# Patient Record
Sex: MALE | Race: WHITE | Employment: FULL TIME | URBAN - METROPOLITAN AREA
[De-identification: names, ages, dates, MRNs, and addresses within clinical notes are randomized per-mention and may not be internally consistent; named-entity substitution may affect disease eponyms.]

---

## 2021-08-02 ENCOUNTER — HOSPITAL ENCOUNTER (EMERGENCY)
Age: 31
Discharge: HOME | End: 2021-08-02
Attending: EMERGENCY MEDICINE
Payer: COMMERCIAL

## 2021-08-02 ENCOUNTER — APPOINTMENT (OUTPATIENT)
Dept: GENERAL RADIOLOGY | Age: 31
End: 2021-08-02
Attending: EMERGENCY MEDICINE
Payer: COMMERCIAL

## 2021-08-02 VITALS
HEIGHT: 67 IN | DIASTOLIC BLOOD PRESSURE: 96 MMHG | RESPIRATION RATE: 27 BRPM | OXYGEN SATURATION: 94 % | SYSTOLIC BLOOD PRESSURE: 142 MMHG | WEIGHT: 188.71 LBS | HEART RATE: 106 BPM | BODY MASS INDEX: 29.62 KG/M2 | TEMPERATURE: 99.3 F

## 2021-08-02 DIAGNOSIS — J12.9 VIRAL PNEUMONIA: Primary | ICD-10-CM

## 2021-08-02 LAB
ALBUMIN SERPL-MCNC: 3.5 G/DL (ref 3.5–5)
ALBUMIN/GLOB SERPL: 0.9 {RATIO} (ref 1.1–2.2)
ALP SERPL-CCNC: 61 U/L (ref 45–117)
ALT SERPL-CCNC: 30 U/L (ref 12–78)
ANION GAP SERPL CALC-SCNC: 13 MMOL/L (ref 5–15)
AST SERPL-CCNC: 38 U/L (ref 15–37)
BASOPHILS # BLD: 0 K/UL (ref 0–0.1)
BASOPHILS NFR BLD: 0 % (ref 0–1)
BILIRUB SERPL-MCNC: 0.6 MG/DL (ref 0.2–1)
BUN SERPL-MCNC: 18 MG/DL (ref 6–20)
BUN/CREAT SERPL: 15 (ref 12–20)
CALCIUM SERPL-MCNC: 8.5 MG/DL (ref 8.5–10.1)
CHLORIDE SERPL-SCNC: 98 MMOL/L (ref 97–108)
CO2 SERPL-SCNC: 25 MMOL/L (ref 21–32)
CREAT SERPL-MCNC: 1.24 MG/DL (ref 0.7–1.3)
DIFFERENTIAL METHOD BLD: ABNORMAL
EOSINOPHIL # BLD: 0 K/UL (ref 0–0.4)
EOSINOPHIL NFR BLD: 0 % (ref 0–7)
ERYTHROCYTE [DISTWIDTH] IN BLOOD BY AUTOMATED COUNT: 12.4 % (ref 11.5–14.5)
GLOBULIN SER CALC-MCNC: 3.8 G/DL (ref 2–4)
GLUCOSE SERPL-MCNC: 93 MG/DL (ref 65–100)
HCT VFR BLD AUTO: 42.9 % (ref 36.6–50.3)
HGB BLD-MCNC: 14.8 G/DL (ref 12.1–17)
IMM GRANULOCYTES # BLD AUTO: 0 K/UL (ref 0–0.04)
IMM GRANULOCYTES NFR BLD AUTO: 0 % (ref 0–0.5)
LYMPHOCYTES # BLD: 1.1 K/UL (ref 0.8–3.5)
LYMPHOCYTES NFR BLD: 22 % (ref 12–49)
MCH RBC QN AUTO: 29.4 PG (ref 26–34)
MCHC RBC AUTO-ENTMCNC: 34.5 G/DL (ref 30–36.5)
MCV RBC AUTO: 85.3 FL (ref 80–99)
MONOCYTES # BLD: 0.3 K/UL (ref 0–1)
MONOCYTES NFR BLD: 5 % (ref 5–13)
NEUTS SEG # BLD: 3.6 K/UL (ref 1.8–8)
NEUTS SEG NFR BLD: 73 % (ref 32–75)
NRBC # BLD: 0 K/UL (ref 0–0.01)
NRBC BLD-RTO: 0 PER 100 WBC
PLATELET # BLD AUTO: 113 K/UL (ref 150–400)
PMV BLD AUTO: 12.1 FL (ref 8.9–12.9)
POTASSIUM SERPL-SCNC: 3.5 MMOL/L (ref 3.5–5.1)
PROT SERPL-MCNC: 7.3 G/DL (ref 6.4–8.2)
RBC # BLD AUTO: 5.03 M/UL (ref 4.1–5.7)
SODIUM SERPL-SCNC: 136 MMOL/L (ref 136–145)
TROPONIN I SERPL-MCNC: <0.05 NG/ML
WBC # BLD AUTO: 5 K/UL (ref 4.1–11.1)

## 2021-08-02 PROCEDURE — 85025 COMPLETE CBC W/AUTO DIFF WBC: CPT

## 2021-08-02 PROCEDURE — 84484 ASSAY OF TROPONIN QUANT: CPT

## 2021-08-02 PROCEDURE — 74011250636 HC RX REV CODE- 250/636: Performed by: EMERGENCY MEDICINE

## 2021-08-02 PROCEDURE — 94640 AIRWAY INHALATION TREATMENT: CPT

## 2021-08-02 PROCEDURE — 96361 HYDRATE IV INFUSION ADD-ON: CPT

## 2021-08-02 PROCEDURE — 36415 COLL VENOUS BLD VENIPUNCTURE: CPT

## 2021-08-02 PROCEDURE — 93005 ELECTROCARDIOGRAM TRACING: CPT

## 2021-08-02 PROCEDURE — 74011250637 HC RX REV CODE- 250/637: Performed by: EMERGENCY MEDICINE

## 2021-08-02 PROCEDURE — 96374 THER/PROPH/DIAG INJ IV PUSH: CPT

## 2021-08-02 PROCEDURE — 71045 X-RAY EXAM CHEST 1 VIEW: CPT

## 2021-08-02 PROCEDURE — 99285 EMERGENCY DEPT VISIT HI MDM: CPT

## 2021-08-02 PROCEDURE — 80053 COMPREHEN METABOLIC PANEL: CPT

## 2021-08-02 RX ORDER — ONDANSETRON 2 MG/ML
4 INJECTION INTRAMUSCULAR; INTRAVENOUS
Status: COMPLETED | OUTPATIENT
Start: 2021-08-02 | End: 2021-08-02

## 2021-08-02 RX ORDER — ONDANSETRON 4 MG/1
4 TABLET, ORALLY DISINTEGRATING ORAL
Qty: 20 TABLET | Refills: 0 | Status: SHIPPED | OUTPATIENT
Start: 2021-08-02

## 2021-08-02 RX ORDER — ALBUTEROL SULFATE 90 UG/1
4 AEROSOL, METERED RESPIRATORY (INHALATION)
Status: COMPLETED | OUTPATIENT
Start: 2021-08-02 | End: 2021-08-02

## 2021-08-02 RX ADMIN — ONDANSETRON 4 MG: 2 INJECTION INTRAMUSCULAR; INTRAVENOUS at 22:32

## 2021-08-02 RX ADMIN — ALBUTEROL SULFATE 4 PUFF: 90 AEROSOL, METERED RESPIRATORY (INHALATION) at 22:33

## 2021-08-02 RX ADMIN — SODIUM CHLORIDE 1000 ML: 9 INJECTION, SOLUTION INTRAVENOUS at 22:31

## 2021-08-02 NOTE — Clinical Note
[facility]  [address]  [id number]  [phone]    Work/School Note    Date: 8/2/2021     To Whom It May concern:    Lucrecia Timmons was evaulated by the following provider(s):  Attending Provider: Ana Combs MD.   COVID19 virus is suspected. Per the CDC guidelines we recommend home isolation until the following conditions are all met:    1. At least 10 days have passed since symptoms first appeared and  2. At least 24 hours have passed since last fever without the use of fever-reducing medications and  3.  Symptoms (e.g., cough, shortness of breath) have improved    Sincerely,          Sandy Westfall MD

## 2021-08-03 NOTE — ED NOTES
Patient called out requesting a blanket, patient is rigorous. Patient's temp remains 99.3 with recheck. Patient provided with blanket. Patient also states his nausea is much better after the zofran. Patient remains NSR-Sinus Tach on the monitor, NIBP WNL, SPO2 WNL on RA.

## 2021-08-03 NOTE — ED NOTES
Pt calling out of concern [address] has not received E-scribed medication of Zofran ODT. This RN confirmed pharmacy with pt. This RN called over to pharmacy and from [facility] looks like pt received a paper copy of prescription. This RN called in prescription to [address] [place] [address]. Pt notified.

## 2021-08-03 NOTE — ED NOTES
Pain assessment on discharge was   Condition Stable  Patient discharged to home  Patient education was completed  Education taught to patient  Teaching method used was handout and verbal  Understanding of teaching was good  Patient was discharged ambulatory  Discharged with family  Valuables were given to: patient

## 2021-08-03 NOTE — ED TRIAGE NOTES
Patient began feeling ill on Tuesday N/V/D, loss of taste and smell, HA, cough with wheezing. Wife is positive for Covid-19. Not Vaccinated.    Last Ibuprofen was around 1600 - 800mg

## 2021-08-03 NOTE — ED PROVIDER NOTES
The history is provided by the patient. No  was used. Flu  This is a new problem. The current episode started more than 1 week ago. The problem occurs constantly. The problem has not changed since onset. The cough is non-productive. There has been a fever of 102 - 102.9 F. The fever has been present for 3 - 4 days. Associated symptoms include chest pain, chills, sweats, headaches, sore throat, myalgias, shortness of breath, nausea and vomiting. Pertinent negatives include no weight loss, no eye redness, no ear congestion, no rhinorrhea, no wheezing and no confusion. He has tried nothing for the symptoms. The treatment provided no relief. Risk factors include travel to endemic areas. He is not a smoker. No past medical history on file. No past surgical history on file. No family history on file. Social History     Socioeconomic History    Marital status: Not on file     Spouse name: Not on file    Number of children: Not on file    Years of education: Not on file    Highest education level: Not on file   Occupational History    Not on file   Tobacco Use    Smoking status: Never Smoker    Smokeless tobacco: Never Used   Vaping Use    Vaping Use: Never used   Substance and Sexual Activity    Alcohol use: Yes     Comment: socially    Drug use: Never    Sexual activity: Not on file   Other Topics Concern    Not on file   Social History Narrative    Not on file     Social Determinants of Health     Financial Resource Strain:     Difficulty of Paying Living Expenses:    Food Insecurity:     Worried About [address] in the Last Year:     [address] in the Last Year:    Transportation Needs:     Lack of Transportation (Medical):      Lack of Transportation (Non-Medical):    Physical Activity:     Days of Exercise per Week:     Minutes of Exercise per Session:    Stress:     Feeling of Stress :    Social Connections:     Frequency of Communication with Friends and Family:     Frequency of Social Gatherings with Friends and Family:     Attends Muslim Services:     Active Member of Clubs or Organizations:     Attends Club or Organization Meetings:     Marital Status:    Intimate Partner Violence:     Fear of Current or Ex-Partner:     Emotionally Abused:     Physically Abused:     Sexually Abused: ALLERGIES: Patient has no known allergies. Review of Systems   Constitutional: Positive for chills. Negative for activity change, fever and weight loss. HENT: Positive for sore throat. Negative for nosebleeds, rhinorrhea, trouble swallowing and voice change. Eyes: Negative for redness and visual disturbance. Respiratory: Positive for shortness of breath. Negative for wheezing. Cardiovascular: Positive for chest pain. Negative for palpitations. Gastrointestinal: Positive for nausea and vomiting. Negative for abdominal pain, constipation and diarrhea. Genitourinary: Negative for difficulty urinating, dysuria, hematuria and urgency. Musculoskeletal: Positive for myalgias. Negative for back pain, neck pain and neck stiffness. Skin: Negative for color change. Allergic/Immunologic: Negative for immunocompromised state. Neurological: Positive for headaches. Negative for dizziness, seizures, syncope, weakness, light-headedness and numbness. Psychiatric/Behavioral: Negative for behavioral problems, confusion, hallucinations, self-injury and suicidal ideas. Vitals:    08/02/21 2200 08/02/21 2201   BP: 137/86 (!) 152/85   Pulse:  (!) 106   Resp:  22   Temp:  99.8 °F (37.7 °C)   SpO2: 96% 97%   Weight:  85.6 kg (188 lb 11.4 oz)   Height:  5' 7\" (1.702 m)            Physical Exam  Vitals and nursing note reviewed. Constitutional:       General: He is not in acute distress. Appearance: He is well-developed. He is not diaphoretic. HENT:      Head: Normocephalic and atraumatic.    Eyes:      Pupils: Pupils are equal, round, and reactive to light. Cardiovascular:      Rate and Rhythm: Normal rate and regular rhythm. Heart sounds: Normal heart sounds. No murmur heard. No friction rub. No gallop. Pulmonary:      Effort: Pulmonary effort is normal. No respiratory distress. Breath sounds: Rhonchi present. No wheezing. Abdominal:      General: Bowel sounds are normal. There is no distension. Palpations: Abdomen is soft. Tenderness: There is no abdominal tenderness. There is no guarding or rebound. Musculoskeletal:         General: Normal range of motion. Cervical back: Normal range of motion and neck supple. Skin:     General: Skin is warm. Findings: No rash. Neurological:      Mental Status: He is alert and oriented to person, place, and time. Psychiatric:         Behavior: Behavior normal.         Thought Content: Thought content normal.         Judgment: Judgment normal.          MDM     This is a 27-year-old male with past medical history, review of systems, physical exam as above, presenting with complaints of cough, myalgias, fever. Patient states his wife recently tested positive for coronavirus. Patient states symptoms have been ongoing for approximately 1 week, after they vacationed with his family in [address] [place] [address]. Patient states he is not vaccinated for coronavirus. He denies a history of chronic medical problems, or daily medications. He endorses chest pain, shortness of breath, myalgias, headache, fevers, nausea and vomiting, intolerance of p.o. Physical exam remarkable for a well-appearing young male, in no acute distress noted to be hypertensive, afebrile, mildly tachycardic, satting well on room air. He has mild rhonchi to auscultation, regular rate and rhythm without murmurs gallops or rubs. He presented to the emergency department as they were using a home pulse oximeter, and noted room air saturations in the 70s and 80s.   Patient is noted to be satting 97% on room air. Suspect symptomatic coronavirus infection, vital signs reassuring, tight cough on exam.  Plan to provide IV fluids and antiemetics, albuterol MDI, obtain EKG, chest x-ray, cardiac enzymes, CMP and CBC. We will reassess, and make a disposition. Procedures    11:09 PM  Patient remains in no acute distress, mildly tachycardic likely due to underlying disease as well as albuterol. Lab work without acute abnormality, chest x-ray corresponding with likely viral pneumonia. Patient states symptoms improved with albuterol, Zofran. Will discharge home with the same, recommend quarantine, primary care follow-up, return precautions given.

## 2021-08-04 LAB
ATRIAL RATE: 103 BPM
CALCULATED P AXIS, ECG09: 24 DEGREES
CALCULATED R AXIS, ECG10: 31 DEGREES
CALCULATED T AXIS, ECG11: 9 DEGREES
DIAGNOSIS, 93000: NORMAL
P-R INTERVAL, ECG05: 136 MS
Q-T INTERVAL, ECG07: 334 MS
QRS DURATION, ECG06: 82 MS
QTC CALCULATION (BEZET), ECG08: 437 MS
VENTRICULAR RATE, ECG03: 103 BPM

## 2023-05-14 RX ORDER — ONDANSETRON 4 MG/1
4 TABLET, ORALLY DISINTEGRATING ORAL EVERY 8 HOURS PRN
COMMUNITY
Start: 2021-08-02